# Patient Record
Sex: FEMALE | Race: WHITE | NOT HISPANIC OR LATINO
[De-identification: names, ages, dates, MRNs, and addresses within clinical notes are randomized per-mention and may not be internally consistent; named-entity substitution may affect disease eponyms.]

---

## 2020-01-16 ENCOUNTER — LABORATORY RESULT (OUTPATIENT)
Age: 55
End: 2020-01-16

## 2020-01-17 ENCOUNTER — APPOINTMENT (OUTPATIENT)
Dept: HEMATOLOGY ONCOLOGY | Facility: CLINIC | Age: 55
End: 2020-01-17
Payer: COMMERCIAL

## 2020-01-17 ENCOUNTER — OUTPATIENT (OUTPATIENT)
Dept: OUTPATIENT SERVICES | Facility: HOSPITAL | Age: 55
LOS: 1 days | End: 2020-01-17
Payer: COMMERCIAL

## 2020-01-17 VITALS
DIASTOLIC BLOOD PRESSURE: 80 MMHG | WEIGHT: 125 LBS | RESPIRATION RATE: 14 BRPM | BODY MASS INDEX: 22.15 KG/M2 | SYSTOLIC BLOOD PRESSURE: 120 MMHG | HEART RATE: 70 BPM | HEIGHT: 63 IN

## 2020-01-17 PROCEDURE — 88264 CHROMOSOME ANALYSIS 20-25: CPT

## 2020-01-17 PROCEDURE — 88237 TISSUE CULTURE BONE MARROW: CPT

## 2020-01-17 PROCEDURE — 88285 CHROMOSOME COUNT ADDITIONAL: CPT

## 2020-01-17 PROCEDURE — 88271 CYTOGENETICS DNA PROBE: CPT

## 2020-01-17 PROCEDURE — 88275 CYTOGENETICS 100-300: CPT

## 2020-01-17 PROCEDURE — 88291 CYTO/MOLECULAR REPORT: CPT

## 2020-01-17 PROCEDURE — 88280 CHROMOSOME KARYOTYPE STUDY: CPT

## 2020-01-17 PROCEDURE — 99205 OFFICE O/P NEW HI 60 MIN: CPT

## 2020-01-17 PROCEDURE — 85025 COMPLETE CBC W/AUTO DIFF WBC: CPT

## 2020-01-17 NOTE — REASON FOR VISIT
[Initial Consultation] : an initial consultation for [CLL] : chronic lymphocytic leukemia [Spouse] : spouse

## 2020-01-21 ENCOUNTER — LABORATORY RESULT (OUTPATIENT)
Age: 55
End: 2020-01-21

## 2020-01-21 DIAGNOSIS — C91.10 CHRONIC LYMPHOCYTIC LEUKEMIA OF B-CELL TYPE NOT HAVING ACHIEVED REMISSION: ICD-10-CM

## 2020-01-24 LAB — CHROM ANALY INTERPHASE BLD FISH-IMP: SIGNIFICANT CHANGE UP

## 2020-01-29 LAB — CHROM ANALY OVERALL INTERP SPEC-IMP: SIGNIFICANT CHANGE UP

## 2020-03-04 ENCOUNTER — APPOINTMENT (OUTPATIENT)
Dept: HEMATOLOGY ONCOLOGY | Facility: CLINIC | Age: 55
End: 2020-03-04
Payer: COMMERCIAL

## 2020-03-04 VITALS
SYSTOLIC BLOOD PRESSURE: 114 MMHG | HEART RATE: 70 BPM | WEIGHT: 125 LBS | DIASTOLIC BLOOD PRESSURE: 70 MMHG | BODY MASS INDEX: 22.14 KG/M2 | OXYGEN SATURATION: 98 %

## 2020-03-04 PROCEDURE — 85025 COMPLETE CBC W/AUTO DIFF WBC: CPT

## 2020-03-04 PROCEDURE — 99214 OFFICE O/P EST MOD 30 MIN: CPT

## 2020-03-05 LAB
ALBUMIN SERPL ELPH-MCNC: 4.7 G/DL
ALP BLD-CCNC: 86 U/L
ALT SERPL-CCNC: 16 U/L
ANION GAP SERPL CALC-SCNC: 14 MMOL/L
AST SERPL-CCNC: 17 U/L
BILIRUB SERPL-MCNC: 0.2 MG/DL
BUN SERPL-MCNC: 13 MG/DL
CALCIUM SERPL-MCNC: 9.7 MG/DL
CHLORIDE SERPL-SCNC: 104 MMOL/L
CO2 SERPL-SCNC: 27 MMOL/L
CREAT SERPL-MCNC: 0.92 MG/DL
GLUCOSE SERPL-MCNC: 97 MG/DL
LDH SERPL-CCNC: 182 U/L
MAGNESIUM SERPL-MCNC: 2.4 MG/DL
PHOSPHATE SERPL-MCNC: 3 MG/DL
POTASSIUM SERPL-SCNC: 3.7 MMOL/L
PROT SERPL-MCNC: 6.5 G/DL
SODIUM SERPL-SCNC: 145 MMOL/L
URATE SERPL-MCNC: 5.9 MG/DL

## 2020-03-17 ENCOUNTER — LABORATORY RESULT (OUTPATIENT)
Age: 55
End: 2020-03-17

## 2020-03-17 ENCOUNTER — APPOINTMENT (OUTPATIENT)
Dept: HEMATOLOGY ONCOLOGY | Facility: CLINIC | Age: 55
End: 2020-03-17
Payer: COMMERCIAL

## 2020-03-17 ENCOUNTER — OUTPATIENT (OUTPATIENT)
Dept: OUTPATIENT SERVICES | Facility: HOSPITAL | Age: 55
LOS: 1 days | End: 2020-03-17
Payer: COMMERCIAL

## 2020-03-17 DIAGNOSIS — C91.10 CHRONIC LYMPHOCYTIC LEUKEMIA OF B-CELL TYPE NOT HAVING ACHIEVED REMISSION: ICD-10-CM

## 2020-03-17 PROCEDURE — 88237 TISSUE CULTURE BONE MARROW: CPT

## 2020-03-17 PROCEDURE — 88275 CYTOGENETICS 100-300: CPT

## 2020-03-17 PROCEDURE — 88291 CYTO/MOLECULAR REPORT: CPT

## 2020-03-17 PROCEDURE — 88285 CHROMOSOME COUNT ADDITIONAL: CPT

## 2020-03-17 PROCEDURE — 88264 CHROMOSOME ANALYSIS 20-25: CPT

## 2020-03-17 PROCEDURE — 38222 DX BONE MARROW BX & ASPIR: CPT | Mod: RT

## 2020-03-17 PROCEDURE — 88280 CHROMOSOME KARYOTYPE STUDY: CPT

## 2020-03-17 PROCEDURE — 88271 CYTOGENETICS DNA PROBE: CPT

## 2020-03-18 DIAGNOSIS — C91.10 CHRONIC LYMPHOCYTIC LEUKEMIA OF B-CELL TYPE NOT HAVING ACHIEVED REMISSION: ICD-10-CM

## 2020-03-19 ENCOUNTER — APPOINTMENT (OUTPATIENT)
Dept: HEMATOLOGY ONCOLOGY | Facility: CLINIC | Age: 55
End: 2020-03-19

## 2020-03-23 PROBLEM — C91.10 CLL (CHRONIC LYMPHOCYTIC LEUKEMIA): Status: ACTIVE | Noted: 2020-01-17

## 2020-03-24 LAB — CHROM ANALY INTERPHASE BLD FISH-IMP: SIGNIFICANT CHANGE UP

## 2020-04-03 LAB — CHROM ANALY OVERALL INTERP SPEC-IMP: SIGNIFICANT CHANGE UP

## 2020-04-08 NOTE — RESULTS/DATA
[FreeTextEntry1] : CBC 03/04/20\par \par WBC: 23\par ALC : 14\par ANC :7.47\par HGB: 13.6 \par PLT : 41

## 2020-04-08 NOTE — HISTORY OF PRESENT ILLNESS
[Disease:__________________________] : Disease: [unfilled] [de-identified] : Patient is a 54 year old female recently diagnosed with CLL who presents for initial evaluation. Per patient, she was on her usual state of health until several months ago when she went for her annual physical exam and was found to have abnormal CBC. Patient states she was told she had elevated WBC and low platelets for which she was referred to hematology. She was seen by Dr. 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 \par \par Patient today reports having occasional tingling and swelling of her lips after applying lipstick which started approximately 6 months ago. Other associated symptoms include geographical tongue with certain foods and chronic sinus infections that do not require antibiotic therapy. She denies fevers, weight loss, night sweats, abdominal pain, early satiety.  Pt state her plt's last week were 21, she was started on a steroid taper which she completed on 2/27/20 . She notices increased fatigue, and weakness especially after 3pm. Pt has noticed eccymosis on b/l legs.  [de-identified] : del 13q\par unmutated

## 2020-04-08 NOTE — PHYSICAL EXAM
[Fully active, able to carry on all pre-disease performance without restriction] : Status 0 - Fully active, able to carry on all pre-disease performance without restriction [Normal] : affect appropriate [de-identified] : minimally enlarged posterior cervical LN [de-identified] : +ecchymosis on b/l legs

## 2020-04-08 NOTE — REVIEW OF SYSTEMS
[Mucosal Pain] : mucosal pain [Hot Flashes] : hot flashes [Fever] : no fever [Chills] : no chills [Night Sweats] : no night sweats [Fatigue] : no fatigue [Eye Pain] : no eye pain [Dysphagia] : no dysphagia [Nosebleeds] : no nosebleeds [Chest Pain] : no chest pain [Palpitations] : no palpitations [Shortness Of Breath] : no shortness of breath [Cough] : no cough [Abdominal Pain] : no abdominal pain [Vomiting] : no vomiting [Constipation] : no constipation [Diarrhea] : no diarrhea [Joint Pain] : no joint pain [Joint Stiffness] : no joint stiffness [Muscle Pain] : no muscle pain [Skin Rash] : no skin rash [Dizziness] : no dizziness [Anxiety] : no anxiety [Easy Bleeding] : no tendency for easy bleeding [Easy Bruising] : no tendency for easy bruising

## 2020-04-08 NOTE — ASSESSMENT
[FreeTextEntry1] : 54 year old female recently diagnosed with unmutated CLL, del 13q who presents for follow up\par \par 1. CBC reviewed: WBC stable. plt count is decreasing. Pt was treated with steroids by Dr. Tan recently and currently plt are at 41K. Pt will be scheduled for a bone marrow biopsy in the next month to evaluate cause of thrombocytopenia.\par \par 2. Routine health maintenance advised\par \par 3. RTO in 3 weeks for bone marrow biopsy

## 2020-09-16 NOTE — ASSESSMENT
[FreeTextEntry1] : 54 year old male recently diagnosed with unmutated CLL, del 13q who presents for second opinion\par \par 1. CBC reviewed: WBC and plt count stable\par 2. Discussed at length diagnosis with patient and her . Since she is completely asymptomatic from CLL we recommend close surveillance\par 3. Will send mutational analysis, FISH, CBC, CMP, b2- microglobulin, immunoglobulins\par \par Case discussed with Dr. Doan. RTC 1 month

## 2020-09-16 NOTE — REVIEW OF SYSTEMS
[Mucosal Pain] : mucosal pain [Hot Flashes] : hot flashes [Chills] : no chills [Fever] : no fever [Fatigue] : no fatigue [Night Sweats] : no night sweats [Eye Pain] : no eye pain [Nosebleeds] : no nosebleeds [Dysphagia] : no dysphagia [Chest Pain] : no chest pain [Palpitations] : no palpitations [Cough] : no cough [Shortness Of Breath] : no shortness of breath [Constipation] : no constipation [Diarrhea] : no diarrhea [Abdominal Pain] : no abdominal pain [Vomiting] : no vomiting [Joint Stiffness] : no joint stiffness [Joint Pain] : no joint pain [Skin Rash] : no skin rash [Dizziness] : no dizziness [Muscle Pain] : no muscle pain [Easy Bleeding] : no tendency for easy bleeding [Anxiety] : no anxiety [Easy Bruising] : no tendency for easy bruising

## 2020-09-16 NOTE — HISTORY OF PRESENT ILLNESS
[Disease:__________________________] : Disease: [unfilled] [de-identified] : Patient is a 54 year old female recently diagnosed with CLL who presents for initial evaluation. Per patient, she was on her usual state of health until several months ago when she went for her annual physical exam and was found to have abnormal CBC. Patient states she was told she had elevated WBC and low platelets for which she was referred to hematology. She was seen by Dr. 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 Most recent CBC from her hematologist's office showed a WBC of 17 and plt of 80.\par \par Patient today reports having occasional tingling and swelling of her lips after applying lipstick which started approximately 6 months ago. Other associated symptoms include geographical tongue with certain foods and chronic sinus infections that do not require antibiotic therapy. She denies fevers, weight loss, fatigue, night sweats, abdominal pain, early satiety.  [de-identified] : del 13q\par unmutated

## 2020-09-16 NOTE — RESULTS/DATA
[FreeTextEntry1] : CBC 1/17/2020:\par \par WBC 16.6\par ALC 11.24\par ANC 4.35\par HGB 13.0\par PLT 97

## 2020-09-16 NOTE — PHYSICAL EXAM
[Fully active, able to carry on all pre-disease performance without restriction] : Status 0 - Fully active, able to carry on all pre-disease performance without restriction [Normal] : affect appropriate [de-identified] : minimally enlarged posterior cervical LN

## 2020-09-17 ENCOUNTER — APPOINTMENT (OUTPATIENT)
Dept: HEMATOLOGY ONCOLOGY | Facility: CLINIC | Age: 55
End: 2020-09-17
Payer: COMMERCIAL

## 2020-09-17 PROCEDURE — 99442: CPT

## 2020-10-25 DIAGNOSIS — Z01.818 ENCOUNTER FOR OTHER PREPROCEDURAL EXAMINATION: ICD-10-CM

## 2020-10-26 ENCOUNTER — OUTPATIENT (OUTPATIENT)
Dept: OUTPATIENT SERVICES | Facility: HOSPITAL | Age: 55
LOS: 1 days | End: 2020-10-26
Payer: COMMERCIAL

## 2020-10-26 VITALS
OXYGEN SATURATION: 99 % | SYSTOLIC BLOOD PRESSURE: 124 MMHG | WEIGHT: 119.93 LBS | DIASTOLIC BLOOD PRESSURE: 80 MMHG | HEART RATE: 70 BPM | HEIGHT: 63 IN | RESPIRATION RATE: 16 BRPM | TEMPERATURE: 99 F

## 2020-10-26 DIAGNOSIS — N94.89 OTHER SPECIFIED CONDITIONS ASSOCIATED WITH FEMALE GENITAL ORGANS AND MENSTRUAL CYCLE: ICD-10-CM

## 2020-10-26 DIAGNOSIS — N92.5 OTHER SPECIFIED IRREGULAR MENSTRUATION: ICD-10-CM

## 2020-10-26 DIAGNOSIS — Z98.890 OTHER SPECIFIED POSTPROCEDURAL STATES: Chronic | ICD-10-CM

## 2020-10-26 DIAGNOSIS — Z01.818 ENCOUNTER FOR OTHER PREPROCEDURAL EXAMINATION: ICD-10-CM

## 2020-10-26 DIAGNOSIS — E03.9 HYPOTHYROIDISM, UNSPECIFIED: ICD-10-CM

## 2020-10-26 DIAGNOSIS — R10.9 UNSPECIFIED ABDOMINAL PAIN: ICD-10-CM

## 2020-10-26 LAB
ANION GAP SERPL CALC-SCNC: 12 MMOL/L — SIGNIFICANT CHANGE UP (ref 5–17)
BLD GP AB SCN SERPL QL: NEGATIVE — SIGNIFICANT CHANGE UP
BUN SERPL-MCNC: 9 MG/DL — SIGNIFICANT CHANGE UP (ref 7–23)
CALCIUM SERPL-MCNC: 9.6 MG/DL — SIGNIFICANT CHANGE UP (ref 8.4–10.5)
CHLORIDE SERPL-SCNC: 103 MMOL/L — SIGNIFICANT CHANGE UP (ref 96–108)
CO2 SERPL-SCNC: 28 MMOL/L — SIGNIFICANT CHANGE UP (ref 22–31)
CREAT SERPL-MCNC: 0.67 MG/DL — SIGNIFICANT CHANGE UP (ref 0.5–1.3)
GLUCOSE SERPL-MCNC: 104 MG/DL — HIGH (ref 70–99)
HCT VFR BLD CALC: 38.3 % — SIGNIFICANT CHANGE UP (ref 34.5–45)
HGB BLD-MCNC: 13 G/DL — SIGNIFICANT CHANGE UP (ref 11.5–15.5)
MCHC RBC-ENTMCNC: 27.5 PG — SIGNIFICANT CHANGE UP (ref 27–34)
MCHC RBC-ENTMCNC: 33.9 GM/DL — SIGNIFICANT CHANGE UP (ref 32–36)
MCV RBC AUTO: 81.1 FL — SIGNIFICANT CHANGE UP (ref 80–100)
NRBC # BLD: 0 /100 WBCS — SIGNIFICANT CHANGE UP (ref 0–0)
PLATELET # BLD AUTO: 67 K/UL — LOW (ref 150–400)
POTASSIUM SERPL-MCNC: 3.8 MMOL/L — SIGNIFICANT CHANGE UP (ref 3.5–5.3)
POTASSIUM SERPL-SCNC: 3.8 MMOL/L — SIGNIFICANT CHANGE UP (ref 3.5–5.3)
RBC # BLD: 4.72 M/UL — SIGNIFICANT CHANGE UP (ref 3.8–5.2)
RBC # FLD: 15.9 % — HIGH (ref 10.3–14.5)
RH IG SCN BLD-IMP: POSITIVE — SIGNIFICANT CHANGE UP
SODIUM SERPL-SCNC: 143 MMOL/L — SIGNIFICANT CHANGE UP (ref 135–145)
WBC # BLD: 4.67 K/UL — SIGNIFICANT CHANGE UP (ref 3.8–10.5)
WBC # FLD AUTO: 4.67 K/UL — SIGNIFICANT CHANGE UP (ref 3.8–10.5)

## 2020-10-26 PROCEDURE — 86901 BLOOD TYPING SEROLOGIC RH(D): CPT

## 2020-10-26 PROCEDURE — 86900 BLOOD TYPING SEROLOGIC ABO: CPT

## 2020-10-26 PROCEDURE — G0463: CPT

## 2020-10-26 PROCEDURE — 85027 COMPLETE CBC AUTOMATED: CPT

## 2020-10-26 PROCEDURE — 86850 RBC ANTIBODY SCREEN: CPT

## 2020-10-26 PROCEDURE — 80048 BASIC METABOLIC PNL TOTAL CA: CPT

## 2020-10-26 RX ORDER — CEFOTETAN DISODIUM 1 G
2 VIAL (EA) INJECTION ONCE
Refills: 0 | Status: DISCONTINUED | OUTPATIENT
Start: 2020-10-30 | End: 2020-11-13

## 2020-10-26 NOTE — H&P PST ADULT - ASSESSMENT
55 year old post menopausal patient with essure requests removal of essure, declines removal of ovaries , benefits of oophorectomy reviewed, recommend removal since patient is post menopausal, she declines unless they are grossly abnormal.  She understands that to remove the essure the fallopian tubes will be removed, Risk of laparoscopy including, but not limited to damage to adjacent tissue, blood vessels and bowel, possible open incision to remove essure implants.  Patient understands that the removal of essure might not alleviate her pain.  Risk of bleeding with low platelet count reviewed,

## 2020-10-26 NOTE — H&P PST ADULT - NSANTHOSAYNRD_GEN_A_CORE
No. SOTERO screening performed.  STOP BANG Legend: 0-2 = LOW Risk; 3-4 = INTERMEDIATE Risk; 5-8 = HIGH Risk

## 2020-10-26 NOTE — H&P PST ADULT - NSICDXPROBLEM_GEN_ALL_CORE_FT
PROBLEM DIAGNOSES  Problem: Nonspecific abdominal pain  Assessment and Plan: Laparoscopic bilateal salpingo-oophorectomy  Removal of Essure  labs- CBC, BMP, T&S  Pre op instructions discussed    Problem: Hypothyroidism  Assessment and Plan: continue with meds

## 2020-10-26 NOTE — H&P PST ADULT - ATTENDING COMMENTS
I have counseled the patient on recommendations and complications of procedure, she agrees laparoscopy, bilateral salpingectomy, removal of essure and removal of ovaries if they are grossly abnormal. She is aware of complications

## 2020-10-26 NOTE — H&P PST ADULT - NSICDXPASTMEDICALHX_GEN_ALL_CORE_FT
PAST MEDICAL HISTORY:  CLL (chronic lymphocytic leukemia) 11/2019 s/p chemo Last chemo 6/2020, last hematology eval 9/3/2020    Hypothyroidism

## 2020-10-27 ENCOUNTER — APPOINTMENT (OUTPATIENT)
Dept: DISASTER EMERGENCY | Facility: CLINIC | Age: 55
End: 2020-10-27

## 2020-10-28 LAB — SARS-COV-2 N GENE NPH QL NAA+PROBE: NOT DETECTED

## 2020-10-29 ENCOUNTER — TRANSCRIPTION ENCOUNTER (OUTPATIENT)
Age: 55
End: 2020-10-29

## 2020-10-30 ENCOUNTER — RESULT REVIEW (OUTPATIENT)
Age: 55
End: 2020-10-30

## 2020-10-30 ENCOUNTER — OUTPATIENT (OUTPATIENT)
Dept: OUTPATIENT SERVICES | Facility: HOSPITAL | Age: 55
LOS: 1 days | End: 2020-10-30
Payer: COMMERCIAL

## 2020-10-30 VITALS
HEIGHT: 63 IN | WEIGHT: 119.93 LBS | TEMPERATURE: 97 F | OXYGEN SATURATION: 100 % | RESPIRATION RATE: 16 BRPM | SYSTOLIC BLOOD PRESSURE: 123 MMHG | HEART RATE: 78 BPM | DIASTOLIC BLOOD PRESSURE: 84 MMHG

## 2020-10-30 VITALS — RESPIRATION RATE: 16 BRPM | HEART RATE: 72 BPM | TEMPERATURE: 97 F | OXYGEN SATURATION: 99 %

## 2020-10-30 DIAGNOSIS — Z98.890 OTHER SPECIFIED POSTPROCEDURAL STATES: Chronic | ICD-10-CM

## 2020-10-30 DIAGNOSIS — N94.89 OTHER SPECIFIED CONDITIONS ASSOCIATED WITH FEMALE GENITAL ORGANS AND MENSTRUAL CYCLE: ICD-10-CM

## 2020-10-30 DIAGNOSIS — N92.5 OTHER SPECIFIED IRREGULAR MENSTRUATION: ICD-10-CM

## 2020-10-30 LAB
PLATELET # BLD AUTO: 65 K/UL — LOW (ref 150–400)
RH IG SCN BLD-IMP: POSITIVE — SIGNIFICANT CHANGE UP

## 2020-10-30 PROCEDURE — 88302 TISSUE EXAM BY PATHOLOGIST: CPT | Mod: 26

## 2020-10-30 PROCEDURE — 85049 AUTOMATED PLATELET COUNT: CPT

## 2020-10-30 PROCEDURE — 88302 TISSUE EXAM BY PATHOLOGIST: CPT

## 2020-10-30 PROCEDURE — 58661 LAPAROSCOPY REMOVE ADNEXA: CPT

## 2020-10-30 PROCEDURE — C9399: CPT

## 2020-10-30 RX ORDER — L.ACIDOPH/B.ANIMALIS/B.LONGUM 15B CELL
1 CAPSULE ORAL
Qty: 0 | Refills: 0 | DISCHARGE

## 2020-10-30 RX ORDER — LIDOCAINE HCL 20 MG/ML
0.2 VIAL (ML) INJECTION ONCE
Refills: 0 | Status: COMPLETED | OUTPATIENT
Start: 2020-10-30 | End: 2020-10-30

## 2020-10-30 RX ORDER — CHOLECALCIFEROL (VITAMIN D3) 125 MCG
4 CAPSULE ORAL
Qty: 0 | Refills: 0 | DISCHARGE

## 2020-10-30 RX ORDER — SODIUM CHLORIDE 9 MG/ML
1000 INJECTION, SOLUTION INTRAVENOUS
Refills: 0 | Status: DISCONTINUED | OUTPATIENT
Start: 2020-10-30 | End: 2020-11-13

## 2020-10-30 RX ORDER — ACETAMINOPHEN 500 MG
3 TABLET ORAL
Qty: 0 | Refills: 0 | DISCHARGE

## 2020-10-30 RX ORDER — CELECOXIB 200 MG/1
200 CAPSULE ORAL ONCE
Refills: 0 | Status: COMPLETED | OUTPATIENT
Start: 2020-10-30 | End: 2020-10-30

## 2020-10-30 RX ORDER — OMEGA-3 ACID ETHYL ESTERS 1 G
1 CAPSULE ORAL
Qty: 0 | Refills: 0 | DISCHARGE

## 2020-10-30 RX ORDER — HYDROMORPHONE HYDROCHLORIDE 2 MG/ML
0.5 INJECTION INTRAMUSCULAR; INTRAVENOUS; SUBCUTANEOUS
Refills: 0 | Status: DISCONTINUED | OUTPATIENT
Start: 2020-10-30 | End: 2020-10-30

## 2020-10-30 RX ORDER — ACETAMINOPHEN 500 MG
975 TABLET ORAL ONCE
Refills: 0 | Status: DISCONTINUED | OUTPATIENT
Start: 2020-10-30 | End: 2020-10-30

## 2020-10-30 RX ORDER — CHLORHEXIDINE GLUCONATE 213 G/1000ML
1 SOLUTION TOPICAL ONCE
Refills: 0 | Status: COMPLETED | OUTPATIENT
Start: 2020-10-30 | End: 2020-10-30

## 2020-10-30 RX ORDER — ZINC SULFATE TAB 220 MG (50 MG ZINC EQUIVALENT) 220 (50 ZN) MG
30 TAB ORAL
Qty: 0 | Refills: 0 | DISCHARGE

## 2020-10-30 RX ORDER — SODIUM CHLORIDE 9 MG/ML
3 INJECTION INTRAMUSCULAR; INTRAVENOUS; SUBCUTANEOUS EVERY 8 HOURS
Refills: 0 | Status: DISCONTINUED | OUTPATIENT
Start: 2020-10-30 | End: 2020-10-30

## 2020-10-30 RX ORDER — IBUPROFEN 200 MG
3 TABLET ORAL
Qty: 0 | Refills: 0 | DISCHARGE

## 2020-10-30 RX ORDER — FAMOTIDINE 10 MG/ML
20 INJECTION INTRAVENOUS ONCE
Refills: 0 | Status: COMPLETED | OUTPATIENT
Start: 2020-10-30 | End: 2020-10-30

## 2020-10-30 RX ORDER — THYROID 120 MG
1 TABLET ORAL
Qty: 0 | Refills: 0 | DISCHARGE

## 2020-10-30 RX ORDER — ASCORBIC ACID 60 MG
1 TABLET,CHEWABLE ORAL
Qty: 0 | Refills: 0 | DISCHARGE

## 2020-10-30 RX ORDER — HYDROMORPHONE HYDROCHLORIDE 2 MG/ML
0.25 INJECTION INTRAMUSCULAR; INTRAVENOUS; SUBCUTANEOUS
Refills: 0 | Status: DISCONTINUED | OUTPATIENT
Start: 2020-10-30 | End: 2020-10-30

## 2020-10-30 RX ADMIN — FAMOTIDINE 20 MILLIGRAM(S): 10 INJECTION INTRAVENOUS at 06:07

## 2020-10-30 RX ADMIN — CELECOXIB 200 MILLIGRAM(S): 200 CAPSULE ORAL at 06:07

## 2020-10-30 RX ADMIN — SODIUM CHLORIDE 3 MILLILITER(S): 9 INJECTION INTRAMUSCULAR; INTRAVENOUS; SUBCUTANEOUS at 06:10

## 2020-10-30 RX ADMIN — HYDROMORPHONE HYDROCHLORIDE 0.25 MILLIGRAM(S): 2 INJECTION INTRAMUSCULAR; INTRAVENOUS; SUBCUTANEOUS at 10:45

## 2020-10-30 RX ADMIN — HYDROMORPHONE HYDROCHLORIDE 0.25 MILLIGRAM(S): 2 INJECTION INTRAMUSCULAR; INTRAVENOUS; SUBCUTANEOUS at 10:33

## 2020-10-30 RX ADMIN — Medication 0.2 MILLILITER(S): at 06:09

## 2020-10-30 RX ADMIN — CHLORHEXIDINE GLUCONATE 1 APPLICATION(S): 213 SOLUTION TOPICAL at 06:09

## 2020-10-30 NOTE — PROGRESS NOTE ADULT - SUBJECTIVE AND OBJECTIVE BOX
POST-OP CHECK    S: Pt awake and alert sitting comfortably in chair. Pain well controlled. Tolerating regular diet without nausea and vomiting. Voiding spontaneously Ambulating without difficulty. Denies lightheadedness, CP, SOB, pain in legs.    O:   T(C): --  HR: 69 (10-30-20 @ 11:15) (69 - 90)  BP: 123/71 (10-30-20 @ 11:15) (112/56 - 129/87)  RR: 16 (10-30-20 @ 11:15) (13 - 16)  SpO2: 99% (10-30-20 @ 11:15) (97% - 100%)  Wt(kg): --  I&O's Summary    30 Oct 2020 07:01  -  30 Oct 2020 12:12  --------------------------------------------------------  IN: 890 mL / OUT: 350 mL / NET: 540 mL      Gen:  CV: S1S2, RRR  Lungs: CTA B/L  Abd: soft, appropriately tender, nondistended  Port sites: Clean/dry/intact with overlying steri strips  Ext: No swelling, redness noted

## 2020-10-30 NOTE — BRIEF OPERATIVE NOTE - OPERATION/FINDINGS
EUA revealed small retroverted uterus. On laparoscpy, uterus, b/l tubes and ovaries wnl.  Upper and lower abdominal survey wnl, appendix not visualized.  No trauma to entry site.  Filmy adhesions between the omentum and left upper quadrant superior to post site entry and abdominal side walls.

## 2020-10-30 NOTE — ASU DISCHARGE PLAN (ADULT/PEDIATRIC) - CARE PROVIDER_API CALL
Geraldine Murray  [facility]  [address]  [place] [address]  Phone: [phone]  Fax: [phone]  Follow Up Time:

## 2020-10-30 NOTE — PROGRESS NOTE ADULT - ASSESSMENT
A/P: 55y Female s/p Lsc BS, removal of essure devices, w/CLL (last chemo 6/2020).    Plan:  -Continue routine care  -Analgesia PRN  - Continue regular diet   -Dispo: home    CONRADO Aguilera, PGY1

## 2020-10-30 NOTE — ASU DISCHARGE PLAN (ADULT/PEDIATRIC) - CALL YOUR DOCTOR IF YOU HAVE ANY OF THE FOLLOWING:
Bleeding that does not stop/Pain not relieved by Medications/Fever greater than (need to indicate Fahrenheit or Celsius)/Unable to urinate

## 2020-11-11 LAB — SURGICAL PATHOLOGY STUDY: SIGNIFICANT CHANGE UP

## 2021-05-07 ENCOUNTER — APPOINTMENT (OUTPATIENT)
Dept: HEMATOLOGY ONCOLOGY | Facility: CLINIC | Age: 56
End: 2021-05-07

## 2022-03-24 PROBLEM — E03.9 HYPOTHYROIDISM, UNSPECIFIED: Chronic | Status: ACTIVE | Noted: 2020-10-26

## 2022-03-24 PROBLEM — C91.10 CHRONIC LYMPHOCYTIC LEUKEMIA OF B-CELL TYPE NOT HAVING ACHIEVED REMISSION: Chronic | Status: ACTIVE | Noted: 2020-10-26

## 2022-04-01 ENCOUNTER — APPOINTMENT (OUTPATIENT)
Dept: HEMATOLOGY ONCOLOGY | Facility: CLINIC | Age: 57
End: 2022-04-01
Payer: COMMERCIAL

## 2022-04-01 PROCEDURE — 99213 OFFICE O/P EST LOW 20 MIN: CPT | Mod: 95

## 2022-04-01 NOTE — PHYSICAL EXAM
[Fully active, able to carry on all pre-disease performance without restriction] : Status 0 - Fully active, able to carry on all pre-disease performance without restriction [Normal] : affect appropriate [de-identified] : A&O x 3

## 2022-04-01 NOTE — REVIEW OF SYSTEMS
[Hot Flashes] : hot flashes [Negative] : Allergic/Immunologic [Eye Pain] : no eye pain [Nosebleeds] : no nosebleeds [Skin Rash] : no skin rash [Dizziness] : no dizziness [Anxiety] : no anxiety [Easy Bleeding] : no tendency for easy bleeding [Easy Bruising] : no tendency for easy bruising

## 2022-04-01 NOTE — END OF VISIT
Pain improving with duloxetine, continue   [FreeTextEntry3] : Case d/w ARNIE Roman [Time Spent: ___ minutes] : I have spent [unfilled] minutes of time on the encounter.

## 2022-04-01 NOTE — HISTORY OF PRESENT ILLNESS
[Disease:__________________________] : Disease: [unfilled] [Home] : at home, [unfilled] , at the time of the visit. [Medical Office: ([facility])___] : at the medical office located in  [Verbal consent obtained from patient] : the patient, [unfilled] [de-identified] : Patient is a 54 year old female recently diagnosed with CLL who presents for follow up regarding recent drop in plts.\par \par CLL history:  Per patient, she was on her usual state of health until several months ago when she went for her annual physical exam and was found to have abnormal CBC. Patient states she was told she had elevated WBC and low platelets for which she was referred to hematology. She was seen by Dr. 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 \par \par She has a history of chronic ITP. Given a drop in her plt back in 2020 from 95k to 65k. In March 2020 bone marrow bx confirmed CLL but with adequate megakaryocytes. Patient follows up with her local heme/onc Dr. Tan closely. \par In 4/2021 patient received Rituxan weekly x 4.  In 3/2022, plts dropped to 24k, patient received IVIG, with no response at which plt went from 24k to 2k after IVIG. Patient was sent  to the ER by Dr. Tan and started methylprednisone 1 gm IV x 3 days. \par Upon discharge, patient is currently on maintenance Rituxan, N-plate, and prednisone taper (currently on 40mg QD). \par  [de-identified] : del 13q\par unmutated [de-identified] : 4/1/22: Patient presents today via [facility] for follow up. She is currently at her local infusion center receiving Rituxan and N-plate. She remains on prednisone taper at 40 mg. PAtient report she was quite anxious last week due to her plt dropping to 2,000 and had to be sent to the ER. Currently she is feeling better as her plt today loree to 38k as per patient. She is tolerating Rituxan and n-plate. No fevers/chills. No bothersome lymph nodes. No easy bruising/bleeding. No abd pain. No CP/SOB. Nomral /GI. No recent/recurrent infections.

## 2022-04-01 NOTE — ASSESSMENT
[FreeTextEntry1] : 56 year old female  diagnosed with unmutated CLL, del 13q and chronic ITP who presents for follow up.\par She has a history of chronic ITP. Given a drop in her plt back in 2020 from 95k to 65k, in March 2020 bone marrow bx confirmed CLL but with adequate megakaryocytes. Patient follows up with her local heme/onc Dr. Tan closely. \par In 4/2021 patient received Rituxan weekly x 4.  In 3/2022, plts dropped to 24k, patient received IVIG, with no response at which plt went from 24k to 2k after IVIG. Patient was sent  to the ER by Dr. Tan and started methylprednisone 1 gm IV x 3 days. \par Upon discharge, patient is currently on maintenance Rituxan, N-plate, and prednisone taper (currently on 40mg QD). \par \par 1. CLL/ITP: continue close observation with Dr. Tan. c/w maintenance Rituxan and N-plate given chronic history of ITP and plt <50. \par \par 2. Routine health maintenance advised\par \par 3.RTO prn

## 2023-03-21 ENCOUNTER — NON-APPOINTMENT (OUTPATIENT)
Age: 58
End: 2023-03-21

## 2023-05-03 NOTE — ASU PATIENT PROFILE, ADULT - IS PATIENT PREGNANT?
Head, normocephalic, atraumatic, Face, Face within normal limits, Ears, External ears within normal limits, Nose/Nasopharynx, External nose normal appearance
no

## 2025-04-11 NOTE — H&P PST ADULT - MEDICATION HERBAL REMEDIES, PROFILE
Problem: Safety - Adult  Goal: Free from fall injury  4/11/2025 0313 by Sonia Larson RN  Outcome: Progressing  4/10/2025 1900 by Elvira Sales RN  Outcome: Progressing  Flowsheets (Taken 4/10/2025 1745)  Free From Fall Injury:   Instruct family/caregiver on patient safety   Based on caregiver fall risk screen, instruct family/caregiver to ask for assistance with transferring infant if caregiver noted to have fall risk factors     Problem: Discharge Planning  Goal: Discharge to home or other facility with appropriate resources  4/11/2025 0313 by Sonia Larson RN  Outcome: Progressing  4/10/2025 1900 by Elvira Sales RN  Outcome: Progressing  Flowsheets (Taken 4/10/2025 1745)  Discharge to home or other facility with appropriate resources:   Identify barriers to discharge with patient and caregiver   Arrange for needed discharge resources and transportation as appropriate   Identify discharge learning needs (meds, wound care, etc)     Problem: Skin/Tissue Integrity  Goal: Skin integrity remains intact  Description: 1.  Monitor for areas of redness and/or skin breakdown  2.  Assess vascular access sites hourly  3.  Every 4-6 hours minimum:  Change oxygen saturation probe site  4.  Every 4-6 hours:  If on nasal continuous positive airway pressure, respiratory therapy assess nares and determine need for appliance change or resting period  4/11/2025 0313 by Sonia Larson RN  Outcome: Progressing  4/10/2025 1900 by Elvira Sales RN  Outcome: Progressing     Problem: ABCDS Injury Assessment  Goal: Absence of physical injury  4/11/2025 0313 by Sonia Larson RN  Outcome: Progressing  4/10/2025 1900 by Elvira Sales RN  Outcome: Progressing      no